# Patient Record
Sex: FEMALE | URBAN - METROPOLITAN AREA
[De-identification: names, ages, dates, MRNs, and addresses within clinical notes are randomized per-mention and may not be internally consistent; named-entity substitution may affect disease eponyms.]

---

## 2018-04-20 ENCOUNTER — TELEPHONE (OUTPATIENT)
Dept: OTHER | Facility: CLINIC | Age: 33
End: 2018-04-20

## 2018-04-20 NOTE — TELEPHONE ENCOUNTER
4/20/2018      [facility] Choices- declined on-boarding.             Outreach ,  Laly Nguyen